# Patient Record
Sex: FEMALE | Race: WHITE | URBAN - METROPOLITAN AREA
[De-identification: names, ages, dates, MRNs, and addresses within clinical notes are randomized per-mention and may not be internally consistent; named-entity substitution may affect disease eponyms.]

---

## 2021-08-28 ENCOUNTER — TELEPHONE ENCOUNTER (OUTPATIENT)
Dept: URBAN - METROPOLITAN AREA CLINIC 13 | Facility: CLINIC | Age: 66
End: 2021-08-28

## 2021-08-29 ENCOUNTER — TELEPHONE ENCOUNTER (OUTPATIENT)
Dept: URBAN - METROPOLITAN AREA CLINIC 13 | Facility: CLINIC | Age: 66
End: 2021-08-29

## 2023-01-01 ENCOUNTER — HOSPITAL ENCOUNTER (EMERGENCY)
Age: 68
Discharge: HOME | End: 2023-01-01
Payer: MEDICARE

## 2023-01-01 VITALS
BODY MASS INDEX: 26.68 KG/M2 | DIASTOLIC BLOOD PRESSURE: 79 MMHG | WEIGHT: 153 LBS | RESPIRATION RATE: 15 BRPM | SYSTOLIC BLOOD PRESSURE: 133 MMHG | OXYGEN SATURATION: 98 % | HEART RATE: 88 BPM | TEMPERATURE: 98 F

## 2023-01-01 DIAGNOSIS — R20.2 FACIAL PARESTHESIA: Primary | ICD-10-CM

## 2023-01-01 PROCEDURE — 99282 EMERGENCY DEPT VISIT SF MDM: CPT

## 2023-01-01 ASSESSMENT — PAIN - FUNCTIONAL ASSESSMENT
PAIN_FUNCTIONAL_ASSESSMENT: NONE - DENIES PAIN
PAIN_FUNCTIONAL_ASSESSMENT: NONE - DENIES PAIN

## 2023-01-02 NOTE — ED PROVIDER NOTES
[address] [address] [address] [address]  Dept: [phone]  Loc: [address] ENCOUNTER        This patient was not seen or evaluated by the attending physician. I evaluated this patient, the attending physician was available for consultation. CHIEF COMPLAINT    Chief Complaint   Patient presents with    Facial Pain     Patient states bilateral sides of face seems \"numb and burning\" intermittant over the past few weeks. Denies rash or injury. Is on 20mg of Prednisone daily due to diagnosis of Sarcoidosis, is concerned this is a side effect of this medication. HPI    Tristin Sparrow is a 79 y.o. female who presents with numbness and tingling of the face. Onset was 2 days ago. The duration has been intermittent since the onset. No aggravating or alleviating factors. Patient denies any headache or double vision. Patient states she first had the symptoms after taking her prednisone on Friday morning. She states that today came on later in the day. She was told by her PCP to come to the ED if she had recurrence of the symptoms so she presented for evaluation    REVIEW OF SYSTEMS    Neuro: see HPI, no LOC  Cardiac: No chest pain or palpitations  Respiratory: No shortness of breath or new cough  General: No fevers or chills  : No dysuria or hematuria  GI: No vomiting or diarrhea  Musculoskeletal: See HPI  All other systems reviewed and are negative.     PAST MEDICAL OR SURGICAL HISTORY    Past Medical History:   Diagnosis Date    Hypertension      Past Surgical History:   Procedure Laterality Date    COLONOSCOPY      LAPAROSCOPY      TONSILLECTOMY      UPPER GASTROINTESTINAL ENDOSCOPY         CURRENT MEDICATIONS    Current Outpatient Rx   Medication Sig Dispense Refill    metoprolol tartrate (LOPRESSOR) 25 MG tablet Take 25 mg by mouth daily         ALLERGIES    Allergies   Allergen Reactions    Codeine     Doxycycline     Penicillins     Sulfa Antibiotics        FAMILY OR SOCIAL HISTORY    History reviewed. No pertinent family history. Social History     Socioeconomic History    Marital status: Single     Spouse name: None    Number of children: None    Years of education: None    Highest education level: None   Tobacco Use    Smoking status: Never   Substance and Sexual Activity    Alcohol use: No    Drug use: No       PHYSICAL EXAM    VITAL SIGNS: BP (!) 170/91   Pulse 79   Temp 98 °F (36.7 °C) (Oral)   Resp 14   Wt 153 lb (69.4 kg)   SpO2 98%   BMI 26.68 kg/m²   Constitutional:  Well developed, well nourished, no acute distress  Eyes:  Pupils equally round and reactive to light, sclera nonicteric, no gaze palsy  HENT:  External ears normal, nose normal, oropharynx moist  Neck: Normal range of motion, no tenderness  Respiratory: Lungs clear to auscultation bilaterally, no respiratory distress, no wheezing   Cardiovascular:  Regular rate, regular rhythm, no murmurs   GI:  Soft, nondistended, normal bowel sounds, nontender  Musculoskeletal:  No edema, no acute deformities   Integument:  Skin is warm and dry, no obvious rash    Neurologic:  Awake, alert, oriented, no aphasia, no slurred speech, CN II-XII intact, normal finger to nose test bilaterally, 5/5 strength in all 4 extremities, no arm motor drift, no leg motor drift, sensation to light touch intact bilaterally, patellar reflexes 2+ and equal bilaterally  Vascular: Radial and DP pulses 2+ and equal bilaterally    ED COURSE & MEDICAL DECISION MAKING    Pertinent Labs & Imaging studies reviewed and interpreted. (See chart for details)    See chart for details of medications given during the ED stay.     Vitals:    01/01/23 1936   BP: (!) 170/91   Pulse: 79   Resp: 14   Temp: 98 °F (36.7 °C)   TempSrc: Oral   SpO2: 98%   Weight: 153 lb (69.4 kg)       Differential diagnosis: lower motor neuropathy, pharmacologic cause, malignancy, infection, CVA, TIA, hypoglycemia, electrolyte abnormality, mass lesions, head injury, encephalopathy, seizure, psychogenic, other    Patient is afebrile and nontoxic in appearance. Paresthesias with no symptoms of stroke, NIH 0. Patient states her blood pressure is always elevated when she comes to the hospital or sees her doctor, she takes her blood pressure medication at night. Vitals are otherwise within normal limits. I have low suspicion for acute pathology at this time. She will follow-up with primary care this week. The patient was instructed to follow up as an outpatient in 2 days. The patient was instructed to return to the ED immediately for any new or worsening symptoms. The patient verbalized understanding. FINAL IMPRESSION    1.  Facial paresthesia        PLAN  Discharge with outpatient follow-up      (Please note that this note was completed with a voice recognition program.  Every attempt was made to edit the dictations, but inevitably there remain words that are mis-transcribed.)         Ginette Walker  01/01/23 2009

## 2023-08-01 DIAGNOSIS — I73.9 PERIPHERAL ARTERIAL DISEASE (HCC): Primary | ICD-10-CM

## 2024-03-11 RX ORDER — BUDESONIDE 0.5 MG/2ML
INHALANT ORAL
COMMUNITY
Start: 2023-11-20

## 2024-03-11 RX ORDER — OMEPRAZOLE 20 MG/1
20 CAPSULE, DELAYED RELEASE ORAL DAILY
COMMUNITY

## 2024-03-11 RX ORDER — LOSARTAN POTASSIUM 50 MG/1
1 TABLET ORAL DAILY
COMMUNITY
Start: 2024-02-12

## 2024-03-11 NOTE — PROGRESS NOTES
[facility] ENDOSCOPY COLONOSCOPY/EGD PRE-OPERATIVE INSTRUCTIONS    Procedure date_3/14/2024________  Arrival time___1230_________          Surgery time___1330_________       Clear liquids the day before the procedure. Do not eat or drink anything within 5 hours of your procedure.    This includes water chewing gum, mints and ice chips.   You may brush your teeth and gargle the morning of your surgery, but do not swallow the water    You may be asked to stop blood thinners such as Coumadin, Plavix, Fragmin, Lovenox, etc., or any anti-inflammatories such as:  Aspirin, Ibuprofen, Advil, Naproxen prior to your procedure.   We also ask that you stop any OTC medications such as fish oil, vitamin E, glucosamine, garlic, Multivitamins, COQ 10, etc.    You must make arrangements for a responsible adult to arrive with you and stay in our waiting area during your procedure.  They will also need to take you home after your procedure.    For your safety you will not be allowed to leave alone or drive yourself home.    Also for your safety, it is strongly suggested that someone stay with you the first 24 hours after your procedure.    For your comfort, please wear simple loose fitting clothing to the center.  Please do not bring valuables.      If you have a living will and a durable power of  for healthcare, please bring in a copy.     You will need to bring a photo ID and insurance card    Our goal is to provide you with excellent care so if you have any questions, please contact us at the [facility] at [phone]         Please note these are generalized instructions for all colonoscopy cases, you may be provided with more specific instructions if necessary

## 2024-03-13 ENCOUNTER — ANESTHESIA EVENT (OUTPATIENT)
Dept: ENDOSCOPY | Age: 69
End: 2024-03-13
Payer: MEDICARE

## 2024-03-14 ENCOUNTER — ANESTHESIA (OUTPATIENT)
Dept: ENDOSCOPY | Age: 69
End: 2024-03-14
Payer: MEDICARE

## 2024-03-14 ENCOUNTER — HOSPITAL ENCOUNTER (OUTPATIENT)
Age: 69
Setting detail: OUTPATIENT SURGERY
Discharge: HOME | End: 2024-03-14
Attending: INTERNAL MEDICINE | Admitting: INTERNAL MEDICINE
Payer: MEDICARE

## 2024-03-14 VITALS
RESPIRATION RATE: 18 BRPM | TEMPERATURE: 97.1 F | OXYGEN SATURATION: 97 % | HEART RATE: 56 BPM | SYSTOLIC BLOOD PRESSURE: 105 MMHG | BODY MASS INDEX: 25.87 KG/M2 | WEIGHT: 146 LBS | HEIGHT: 63 IN | DIASTOLIC BLOOD PRESSURE: 57 MMHG

## 2024-03-14 DIAGNOSIS — R13.19 ESOPHAGEAL DYSPHAGIA: ICD-10-CM

## 2024-03-14 DIAGNOSIS — Z83.79 FAMILY HISTORY OF CELIAC DISEASE: ICD-10-CM

## 2024-03-14 DIAGNOSIS — K44.9 HIATAL HERNIA: ICD-10-CM

## 2024-03-14 DIAGNOSIS — R19.4 CHANGE IN BOWEL HABITS: ICD-10-CM

## 2024-03-14 DIAGNOSIS — K21.9 GASTROESOPHAGEAL REFLUX DISEASE, UNSPECIFIED WHETHER ESOPHAGITIS PRESENT: ICD-10-CM

## 2024-03-14 DIAGNOSIS — R19.7 DIARRHEA, UNSPECIFIED TYPE: ICD-10-CM

## 2024-03-14 PROCEDURE — 7100000010 HC PHASE II RECOVERY - FIRST 15 MIN: Performed by: INTERNAL MEDICINE

## 2024-03-14 PROCEDURE — 3700000001 HC ADD 15 MINUTES (ANESTHESIA): Performed by: INTERNAL MEDICINE

## 2024-03-14 PROCEDURE — 6360000002 HC RX W HCPCS

## 2024-03-14 PROCEDURE — 3609012400 HC EGD TRANSORAL BIOPSY SINGLE/MULTIPLE: Performed by: INTERNAL MEDICINE

## 2024-03-14 PROCEDURE — 2580000003 HC RX 258: Performed by: STUDENT IN AN ORGANIZED HEALTH CARE EDUCATION/TRAINING PROGRAM

## 2024-03-14 PROCEDURE — 3700000000 HC ANESTHESIA ATTENDED CARE: Performed by: INTERNAL MEDICINE

## 2024-03-14 PROCEDURE — 88305 TISSUE EXAM BY PATHOLOGIST: CPT

## 2024-03-14 PROCEDURE — 2500000003 HC RX 250 WO HCPCS: Performed by: ANESTHESIOLOGY

## 2024-03-14 PROCEDURE — 7100000011 HC PHASE II RECOVERY - ADDTL 15 MIN: Performed by: INTERNAL MEDICINE

## 2024-03-14 PROCEDURE — 6360000002 HC RX W HCPCS: Performed by: ANESTHESIOLOGY

## 2024-03-14 PROCEDURE — 3609010300 HC COLONOSCOPY W/BIOPSY SINGLE/MULTIPLE: Performed by: INTERNAL MEDICINE

## 2024-03-14 PROCEDURE — 2709999900 HC NON-CHARGEABLE SUPPLY: Performed by: INTERNAL MEDICINE

## 2024-03-14 PROCEDURE — 3609017700 HC EGD DILATION GASTRIC/DUODENAL STRICTURE: Performed by: INTERNAL MEDICINE

## 2024-03-14 PROCEDURE — 2500000003 HC RX 250 WO HCPCS

## 2024-03-14 RX ORDER — PROPOFOL 10 MG/ML
INJECTION, EMULSION INTRAVENOUS PRN
Status: DISCONTINUED | OUTPATIENT
Start: 2024-03-14 | End: 2024-03-14 | Stop reason: SDUPTHER

## 2024-03-14 RX ORDER — SODIUM CHLORIDE 0.9 % (FLUSH) 0.9 %
5-40 SYRINGE (ML) INJECTION PRN
Status: DISCONTINUED | OUTPATIENT
Start: 2024-03-14 | End: 2024-03-14 | Stop reason: HOSPADM

## 2024-03-14 RX ORDER — SODIUM CHLORIDE 0.9 % (FLUSH) 0.9 %
5-40 SYRINGE (ML) INJECTION EVERY 12 HOURS SCHEDULED
Status: DISCONTINUED | OUTPATIENT
Start: 2024-03-14 | End: 2024-03-14 | Stop reason: HOSPADM

## 2024-03-14 RX ORDER — METOCLOPRAMIDE HYDROCHLORIDE 5 MG/ML
INJECTION INTRAMUSCULAR; INTRAVENOUS
Status: COMPLETED
Start: 2024-03-14 | End: 2024-03-14

## 2024-03-14 RX ORDER — SODIUM CHLORIDE 9 MG/ML
INJECTION, SOLUTION INTRAVENOUS PRN
Status: DISCONTINUED | OUTPATIENT
Start: 2024-03-14 | End: 2024-03-14 | Stop reason: HOSPADM

## 2024-03-14 RX ORDER — LIDOCAINE HYDROCHLORIDE 20 MG/ML
INJECTION, SOLUTION INFILTRATION; PERINEURAL PRN
Status: DISCONTINUED | OUTPATIENT
Start: 2024-03-14 | End: 2024-03-14 | Stop reason: SDUPTHER

## 2024-03-14 RX ORDER — METOCLOPRAMIDE HYDROCHLORIDE 5 MG/ML
INJECTION INTRAMUSCULAR; INTRAVENOUS PRN
Status: DISCONTINUED | OUTPATIENT
Start: 2024-03-14 | End: 2024-03-14 | Stop reason: SDUPTHER

## 2024-03-14 RX ORDER — FAMOTIDINE 10 MG/ML
INJECTION, SOLUTION INTRAVENOUS
Status: COMPLETED
Start: 2024-03-14 | End: 2024-03-14

## 2024-03-14 RX ORDER — FAMOTIDINE 10 MG/ML
INJECTION, SOLUTION INTRAVENOUS PRN
Status: DISCONTINUED | OUTPATIENT
Start: 2024-03-14 | End: 2024-03-14 | Stop reason: SDUPTHER

## 2024-03-14 RX ADMIN — LIDOCAINE HYDROCHLORIDE 100 MG: 20 INJECTION, SOLUTION INFILTRATION; PERINEURAL at 14:03

## 2024-03-14 RX ADMIN — FAMOTIDINE 20 MG: 10 INJECTION, SOLUTION INTRAVENOUS at 13:32

## 2024-03-14 RX ADMIN — METOCLOPRAMIDE 10 MG: 5 INJECTION, SOLUTION INTRAMUSCULAR; INTRAVENOUS at 13:32

## 2024-03-14 RX ADMIN — PROPOFOL 20 MG: 10 INJECTION, EMULSION INTRAVENOUS at 14:09

## 2024-03-14 RX ADMIN — SODIUM CHLORIDE: 9 INJECTION, SOLUTION INTRAVENOUS at 14:55

## 2024-03-14 RX ADMIN — SODIUM CHLORIDE: 9 INJECTION, SOLUTION INTRAVENOUS at 13:05

## 2024-03-14 RX ADMIN — PROPOFOL 140 MCG/KG/MIN: 10 INJECTION, EMULSION INTRAVENOUS at 14:04

## 2024-03-14 RX ADMIN — PROPOFOL 70 MG: 10 INJECTION, EMULSION INTRAVENOUS at 14:03

## 2024-03-14 ASSESSMENT — PAIN - FUNCTIONAL ASSESSMENT
PAIN_FUNCTIONAL_ASSESSMENT: NONE - DENIES PAIN
PAIN_FUNCTIONAL_ASSESSMENT: NONE - DENIES PAIN
PAIN_FUNCTIONAL_ASSESSMENT: 0-10
PAIN_FUNCTIONAL_ASSESSMENT: NONE - DENIES PAIN
PAIN_FUNCTIONAL_ASSESSMENT: NONE - DENIES PAIN

## 2024-03-14 NOTE — ANESTHESIA POSTPROCEDURE EVALUATION
Department of Anesthesiology  Postprocedure Note    Patient: Maya Parker  MRN: [id number]  YOB: 1955  Date of evaluation: 3/14/2024    Procedure Summary       Date: 03/14/24 Room / Location: [facility] 02 / [facility]    Anesthesia Start: 1356 Anesthesia Stop: 1500    Procedures:       ESOPHAGOGASTRODUODENOSCOPY BIOPSY      ESOPHAGOGASTRODUODENOSCOPY DILATION BALLOON      COLONOSCOPY BIOPSY Diagnosis:       Esophageal dysphagia      Hiatal hernia      Gastroesophageal reflux disease, unspecified whether esophagitis present      Change in bowel habits      Diarrhea, unspecified type      Family history of celiac disease      (Esophageal dysphagia [R13.19])      (Hiatal hernia [K44.9])      (Gastroesophageal reflux disease, unspecified whether esophagitis present [K21.9])      (Change in bowel habits [R19.4])      (Diarrhea, unspecified type [R19.7])      (Family history of celiac disease [Z83.79])    Surgeons: Priscilla Diana MD Responsible Provider: Roney Avendano MD    Anesthesia Type: MAC ASA Status: 2            Anesthesia Type: MAC    Ramakrishna Phase I: Ramakrishna Score: 10    Ramakrishna Phase II: Ramakrishna Score: 10    Anesthesia Post Evaluation    Patient location during evaluation: PACU  Patient participation: complete - patient participated  Level of consciousness: awake  Airway patency: patent  Nausea & Vomiting: no nausea and no vomiting  Cardiovascular status: blood pressure returned to baseline  Respiratory status: acceptable  Hydration status: stable  Comments: Vital signs stable  OK to discharge from Stage I post anesthesia care.  Care transferred from Anesthesiology department on discharge from perioperative area   Multimodal analgesia pain management approach  Pain management: satisfactory to patient    No notable events documented.

## 2024-03-14 NOTE — H&P
Gastroenterology History and Physical         Patient: Maya Parker    MRN: [id number]    Sex: female    YOB: 1955    Age: 68 y.o.    Location: [facility]         Date:3/14/2024    Primary Care Physician: Eloise Gama MD             Patient: Maya Parker         Physician: Priscilla Diana MD    Esophageal dysphagia [R13.19]  Hiatal hernia [K44.9]  Gastroesophageal reflux disease, unspecified whether esophagitis present [K21.9]  Change in bowel habits [R19.4]  Diarrhea, unspecified type [R19.7]  Family history of celiac disease [Z83.79]      Vital Signs: BP (!) 140/71   Pulse 84   Temp 98 °F (36.7 °C) (Temporal)   Resp 16   Ht 1.6 m (5' 3\")   Wt 66.2 kg (146 lb)   SpO2 98%   BMI 25.86 kg/m²          Allergies:   Allergies   Allergen Reactions    Codeine     Doxycycline     Penicillins     Sulfa Antibiotics     Iodinated Contrast Media Rash            History of Present Illness: 68 y.o. female presents for EGD and colonoscopy for dysphagia, GERD, history of hiatal hernia, change in bowel habits with diarrhea, family history of celiac disease.         History:    Past Medical History:   Diagnosis Date    Hypertension     Prolonged emergence from general anesthesia     Sarcoidosis        Past Surgical History:   Procedure Laterality Date    COLONOSCOPY      KNEE SURGERY Left     LAPAROSCOPY      LUNG BIOPSY      TONSILLECTOMY      UPPER GASTROINTESTINAL ENDOSCOPY      WISDOM TOOTH EXTRACTION         Social History     Socioeconomic History    Marital status: Single     Spouse name: None    Number of children: None    Years of education: None    Highest education level: None   Tobacco Use    Smoking status: Never    Smokeless tobacco: Never   Vaping Use    Vaping Use: Never used   Substance and Sexual Activity    Alcohol use: Yes     Comment: occassionally    Drug use: No       History reviewed. No pertinent family history.         Medications:   Prior to Admission  medications    Medication Sig Start Date End Date Taking? Authorizing Provider   losartan (COZAAR) 50 MG tablet Take 1 tablet by mouth daily 2/12/24  Yes Daniel Sánchez MD   budesonide (PULMICORT) 0.5 MG/2ML nebulizer suspension Add 2 ml budesonide to saline rinse and irrigate nose twice per day. 11/20/23  Yes Daniel Sánchez MD   Probiotic Product (PROBIOTIC ADVANCED PO) Take by mouth   Yes Daniel Sánchez MD   omeprazole (PRILOSEC) 20 MG delayed release capsule Take 1 capsule by mouth daily   Yes Daniel Sánchez MD   Calcium-Vitamin D-Vitamin K 650-12.5-40 MG-MCG-MCG CHEW Take by mouth    Daniel Sánchez MD       ROS: the patient denies stiff neck, double vision, ataxia,  chest pain, orthopnea, sob, wheezing, productive cough    Physical Exam: I personally examined the patient.    Heart: within normal limits    Lungs: no respiratory distress    Mental status:  Within normal limits    Mallampati score: III        Planned Procedure: EGD and colonoscopy         Signed By:    Priscilla Diana MD         March 14, 2024

## 2024-03-14 NOTE — OP NOTE
Endoscopy Note    Patient: Maya Parker  : 1955  Acct#:     Procedure: Esophagogastroduodenoscopy with biopsy, esophageal dilation                       Colonoscopy with biopsy, polypectomy (cold snare)    Date:  3/14/2024     Surgeon:   Priscilla Diana MD    Referring Physician:  Eloise Gama MD    Previous Colonoscopy: YES      Indications: 68 y.o. female presents for EGD and colonoscopy for dysphagia, GERD, history of hiatal hernia, change in bowel habits with diarrhea, family history of celiac disease in two sisters.     Postoperative Diagnosis:    GE junction stricture  Hiatal hernia  Otherwise unremarkable exam of the upper GI tract  5 polyps removed  Diverticulosis  Hemorrhoids    Anesthesia:  MAC, see anesthesia documentation     Estimated Blood Loss (mL): minimal     Complications: None    Specimens:   ID Type Source Tests Collected by Time Destination   A : A. duodenal biopsies Tissue Tissue SURGICAL PATHOLOGY Priscilla Diana MD 3/14/2024 1412    B : B. stomach biopsies Tissue Tissue SURGICAL PATHOLOGY Priscilla Diana MD 3/14/2024 1414    C : C. GE junction stricuture biopsies Tissue Tissue SURGICAL PATHOLOGY Priscilla Diana MD 3/14/2024 1419    D : D. mid esophagus biopsies Tissue Tissue SURGICAL PATHOLOGY Priscilla Diana MD 3/14/2024 1421    E : E. random colon biopsies Tissue Tissue SURGICAL PATHOLOGY Priscilla Diana MD 3/14/2024 1436    F : F. right colon polypectomy x4 Tissue Tissue SURGICAL PATHOLOGY Priscilla Diana MD 3/14/2024 1438    G : G. transverse colon polypectomy Tissue Tissue SURGICAL PATHOLOGY Priscilla Diana MD 3/14/2024 1446        Consent:  The patient or their legal guardian has signed a consent, and is aware of the potential risks, benefits, alternatives, and potential complications of this procedure.  These include, but are not limited to hemorrhage, bleeding, post procedural pain, perforation, phlebitis,

## 2024-03-14 NOTE — DISCHARGE INSTRUCTIONS
Endoscopy Discharge Instructions    Call Dr Diana with any questions or concerns.    You may be drowsy or lightheaded after receiving sedation.  DO NOT operate  a vehicle (automobile, bicycle, motorcycle, machinery, or power tools), no  alcoholic beverages, and do not make any important decisions today.                 Plan on bed rest or quiet relaxation today.  Resume normal activities in the morning.     Resume normal activity tomorrow unless otherwise advised by your physician.            Eat a light first meal, avoiding spicy and fatty foods, then resume normal diet unless you are told otherwise by your physician.    If the intravenous medication site is painful, apply warm compresses on the site until the soreness is relieved and elevate the arm above the heart. Call your physician if no improvement  in 2-3 days.       POSSIBLE SYMPTOMS TO WATCH:     1. fever (greater than 100) 5. increased abdominal bloating   2. severe pain   6. excessive bleeding   3. nausea and vomiting  7. chest pain   4. chills    8. shortness of breath       Notify Dr Diana if these problems occur     Expected as normal and remedies:  Sore throat: use over the counter throat lozenges or gargle with warm salt water.  Redness or soreness at the IV site: apply warm compress  Gaseous discomfort: belching or passing flatus (gas).      Impression:    GE junction stricture  Hiatal hernia  Otherwise unremarkable exam of the upper GI tract  5 polyps removed  Diverticulosis  Hemorrhoids    Recommendations:   Call for results in 15 days  Chew food carefully and slowly  Continue omeprazole  High fiber diet  Repeat colonoscopy in 3 years based on pathology  Follow-up in GI clinic if your symptoms do not improve     Priscilla Diana MD, MD    With questions or concerns, call Dr Diana at .

## 2024-03-14 NOTE — ANESTHESIA PRE PROCEDURE
Department of Anesthesiology  Preprocedure Note       Name:  Maya Parker   Age:  68 y.o.  :  1955                                          MRN:  [id number]         Date:  3/14/2024      Surgeon: Surgeon(s):  Priscilla Diana MD    Procedure: Procedure(s):  COLONOSCOPY DIAGNOSTIC  ESOPHAGOGASTRODUODENOSCOPY    Medications prior to admission:   Prior to Admission medications    Medication Sig Start Date End Date Taking? Authorizing Provider   losartan (COZAAR) 50 MG tablet Take 1 tablet by mouth daily 24  Yes Daniel Sánchez MD   budesonide (PULMICORT) 0.5 MG/2ML nebulizer suspension Add 2 ml budesonide to saline rinse and irrigate nose twice per day. 23  Yes Daniel Sánchez MD   Probiotic Product (PROBIOTIC ADVANCED PO) Take by mouth   Yes Daniel Sánchez MD   omeprazole (PRILOSEC) 20 MG delayed release capsule Take 1 capsule by mouth daily   Yes Daniel Sánchez MD   Calcium-Vitamin D-Vitamin K 650-12.5-40 MG-MCG-MCG CHEW Take by mouth    Daniel Sánchez MD       Current medications:    Current Facility-Administered Medications   Medication Dose Route Frequency Provider Last Rate Last Admin    sodium chloride flush 0.9 % injection 5-40 mL  5-40 mL IntraVENous 2 times per day Zaid Zhang MD        sodium chloride flush 0.9 % injection 5-40 mL  5-40 mL IntraVENous PRN Zaid Zhang MD        0.9 % sodium chloride infusion   IntraVENous PRN Zaid Zhang  mL/hr at 24 1305 New Bag at 24 1305    metoclopramide (REGLAN) 5 MG/ML injection             famotidine (PEPCID) 20 MG/2ML injection                Allergies:    Allergies   Allergen Reactions    Codeine     Doxycycline     Penicillins     Sulfa Antibiotics     Iodinated Contrast Media Rash       Problem List:  There is no problem list on file for this patient.      Past Medical History:        Diagnosis Date    Hypertension     Prolonged emergence from general anesthesia

## 2024-04-15 ENCOUNTER — HOSPITAL ENCOUNTER (OUTPATIENT)
Dept: ULTRASOUND IMAGING | Age: 69
Discharge: HOME | End: 2024-04-15
Attending: INTERNAL MEDICINE
Payer: MEDICARE

## 2024-04-15 DIAGNOSIS — R19.7 DIARRHEA OF PRESUMED INFECTIOUS ORIGIN: ICD-10-CM

## 2024-04-15 DIAGNOSIS — R11.0 NAUSEA: ICD-10-CM

## 2024-04-15 DIAGNOSIS — R14.3 EXCESSIVE GAS: ICD-10-CM

## 2024-04-15 DIAGNOSIS — R19.5 PUS IN STOOL: ICD-10-CM

## 2024-04-15 PROCEDURE — 76705 ECHO EXAM OF ABDOMEN: CPT

## 2025-02-12 LAB
ALBUMIN SERPL-MCNC: 4.7 G/DL (ref 3.4–5)
ALP SERPL-CCNC: 64 U/L (ref 40–129)
ALT SERPL-CCNC: 17 U/L (ref 10–40)
AST SERPL-CCNC: 21 U/L (ref 15–37)
BILIRUB DIRECT SERPL-MCNC: <0.1 MG/DL (ref 0–0.3)
BILIRUB INDIRECT SERPL-MCNC: NORMAL MG/DL (ref 0–1)
BILIRUB SERPL-MCNC: <0.2 MG/DL (ref 0–1)
DEPRECATED RDW RBC AUTO: 13.6 % (ref 12.4–15.4)
HBV SURFACE AB SERPL IA-ACNC: <3.5 MIU/ML
HBV SURFACE AG SERPL QL IA: NORMAL
HCT VFR BLD AUTO: 41.2 % (ref 36–48)
HCV AB SERPL QL IA: NORMAL
HGB BLD-MCNC: 13.9 G/DL (ref 12–16)
IRON SATN MFR SERPL: 21 % (ref 15–50)
IRON SERPL-MCNC: 61 UG/DL (ref 37–145)
MCH RBC QN AUTO: 30.2 PG (ref 26–34)
MCHC RBC AUTO-ENTMCNC: 33.8 G/DL (ref 31–36)
MCV RBC AUTO: 89.4 FL (ref 80–100)
PLATELET # BLD AUTO: 317 K/UL (ref 135–450)
PMV BLD AUTO: 8.9 FL (ref 5–10.5)
PROT SERPL-MCNC: 7 G/DL (ref 6.4–8.2)
RBC # BLD AUTO: 4.61 M/UL (ref 4–5.2)
TIBC SERPL-MCNC: 286 UG/DL (ref 260–445)
WBC # BLD AUTO: 8.6 K/UL (ref 4–11)

## 2025-02-13 LAB — ANA SER QL IA: NEGATIVE

## 2025-02-14 LAB
A1AT SERPL-MCNC: 159 MG/DL (ref 90–200)
CERULOPLASMIN SERPL-MCNC: 24 MG/DL (ref 16–45)
HAV AB SER QL IA: NEGATIVE
HBV CORE AB SERPL QL IA: NEGATIVE
SMA IGG SER-ACNC: 3 UNITS (ref 0–19)

## 2025-02-15 LAB — MITOCHONDRIA M2 AB SER IA-ACNC: 0.7 U/ML (ref 0–4)

## (undated) DEVICE — FORCEPS BX 240CM 2.4MM L NDL RAD JAW 4 M00513334

## (undated) DEVICE — SYRINGE INFL 60ML DISP ALLIANCE II

## (undated) DEVICE — SNARE ENDOSCP POLYP 2.4 MM 240 CM 10 MM 2.8 MM CAPTIVATOR